# Patient Record
Sex: FEMALE | Race: WHITE
[De-identification: names, ages, dates, MRNs, and addresses within clinical notes are randomized per-mention and may not be internally consistent; named-entity substitution may affect disease eponyms.]

---

## 2018-12-04 ENCOUNTER — HOSPITAL ENCOUNTER (INPATIENT)
Dept: HOSPITAL 25 - ED | Age: 23
LOS: 8 days | Discharge: HOME | DRG: 885 | End: 2018-12-12
Attending: PSYCHIATRY & NEUROLOGY | Admitting: PSYCHIATRY & NEUROLOGY
Payer: COMMERCIAL

## 2018-12-04 DIAGNOSIS — Z81.8: ICD-10-CM

## 2018-12-04 DIAGNOSIS — R45.851: ICD-10-CM

## 2018-12-04 DIAGNOSIS — F32.2: Primary | ICD-10-CM

## 2018-12-04 DIAGNOSIS — E03.9: ICD-10-CM

## 2018-12-04 DIAGNOSIS — Z79.899: ICD-10-CM

## 2018-12-04 DIAGNOSIS — F41.1: ICD-10-CM

## 2018-12-04 LAB
BASOPHILS # BLD AUTO: 0 10^3/UL (ref 0–0.2)
EOSINOPHIL # BLD AUTO: 0 10^3/UL (ref 0–0.6)
HCT VFR BLD AUTO: 39 % (ref 35–47)
HGB BLD-MCNC: 13.4 G/DL (ref 12–16)
LYMPHOCYTES # BLD AUTO: 3 10^3/UL (ref 1–4.8)
MCH RBC QN AUTO: 29 PG (ref 27–31)
MCHC RBC AUTO-ENTMCNC: 34 G/DL (ref 31–36)
MCV RBC AUTO: 83 FL (ref 80–97)
MONOCYTES # BLD AUTO: 0.5 10^3/UL (ref 0–0.8)
NEUTROPHILS # BLD AUTO: 3.9 10^3/UL (ref 1.5–7.7)
NRBC # BLD AUTO: 0 10^3/UL
NRBC BLD QL AUTO: 0.1
PLATELET # BLD AUTO: 209 10^3/UL (ref 150–450)
RBC # BLD AUTO: 4.7 10^6/UL (ref 4–5.4)
RBC UR QL AUTO: (no result)
WBC # BLD AUTO: 7.5 10^3/UL (ref 3.5–10.8)
WBC UR QL AUTO: (no result)

## 2018-12-04 PROCEDURE — 80329 ANALGESICS NON-OPIOID 1 OR 2: CPT

## 2018-12-04 PROCEDURE — 87086 URINE CULTURE/COLONY COUNT: CPT

## 2018-12-04 PROCEDURE — 99222 1ST HOSP IP/OBS MODERATE 55: CPT

## 2018-12-04 PROCEDURE — 81015 MICROSCOPIC EXAM OF URINE: CPT

## 2018-12-04 PROCEDURE — 82607 VITAMIN B-12: CPT

## 2018-12-04 PROCEDURE — 82306 VITAMIN D 25 HYDROXY: CPT

## 2018-12-04 PROCEDURE — 81003 URINALYSIS AUTO W/O SCOPE: CPT

## 2018-12-04 PROCEDURE — 80320 DRUG SCREEN QUANTALCOHOLS: CPT

## 2018-12-04 PROCEDURE — 90686 IIV4 VACC NO PRSV 0.5 ML IM: CPT

## 2018-12-04 PROCEDURE — 99238 HOSP IP/OBS DSCHRG MGMT 30/<: CPT

## 2018-12-04 PROCEDURE — 80053 COMPREHEN METABOLIC PANEL: CPT

## 2018-12-04 PROCEDURE — 99283 EMERGENCY DEPT VISIT LOW MDM: CPT

## 2018-12-04 PROCEDURE — 90853 GROUP PSYCHOTHERAPY: CPT

## 2018-12-04 PROCEDURE — 84443 ASSAY THYROID STIM HORMONE: CPT

## 2018-12-04 PROCEDURE — 85025 COMPLETE CBC W/AUTO DIFF WBC: CPT

## 2018-12-04 PROCEDURE — 99231 SBSQ HOSP IP/OBS SF/LOW 25: CPT

## 2018-12-04 PROCEDURE — 80307 DRUG TEST PRSMV CHEM ANLYZR: CPT

## 2018-12-04 PROCEDURE — 36415 COLL VENOUS BLD VENIPUNCTURE: CPT

## 2018-12-04 PROCEDURE — 99232 SBSQ HOSP IP/OBS MODERATE 35: CPT

## 2018-12-04 PROCEDURE — G0480 DRUG TEST DEF 1-7 CLASSES: HCPCS

## 2018-12-04 NOTE — ED
Psychiatric Complaint





- HPI Summary


HPI Summary: 





This pt is a 24 y/o female presenting to [facility] via EMS from 3 on a 9.41. Pt 

reports she has been severely depressed with SI thoughts and plan. Pt states "I 

have been spiraling really hard recently and it happens a lot." She notes since 

the end of last week pt has been getting "Really bad thought about self harm 

with a plan." She describes SI plan 4 days ago of hanging herself. Pt notes on 

the bus ride home yesterday pt had a sudden urge to take a knife to her 

"fingertips and scrape all the meat until I can see the bone." She has taken 

Fluoxetine every day, except she stopped taken it since 3 days ago. Pt does 

admit that increased SI thoughts and plan corresponds to when she stopped 

taking her medication.


Pt denies prior suicide attempt in the past. 


PMHx includes depression and anxiety.





- History Of Current Complaint


Time Seen by Provider: 12/04/18 12:43


Hx Obtained From: Patient, EMS


Hx Last Menstrual Period: 3/14/16


Onset/Duration: Lasting Days, Still Present


Timing: Days


Severity Currently: Severe


Character: Depressed


Aggravating Factor(s): Medication Non-compliance


Alleviating Factor(s): Nothing


Associated Signs And Symptoms: Positive: Confused


Related History: Positive For: Prior Psychiatric Issues


Has Suicidal: Reports: Thoughts, With A Plan


Has Homicidal: Denies: Thoughts, With A Plan





- Allergies/Home Medications


Allergies/Adverse Reactions: 


 Allergies











Allergy/AdvReac Type Severity Reaction Status Date / Time


 


Penicillins Allergy  Hives Verified 12/04/18 13:24














PMH/Surg Hx/FS Hx/Imm Hx


Endocrine/Hematology History: Reports: Other Endocrine/Hematological Disorders 

- hyperthyroidism


   Denies: Hx Anticoagulant Therapy, Hx Diabetes, Hx Thyroid Disease


Cardiovascular History: 


   Denies: Hx Congestive Heart Failure, Hx Deep Vein Thrombosis, Hx Hypertension

, Hx Myocardial Infarction, Hx Pacemaker/ICD


Respiratory History: Reports: Hx Asthma - CHILDHOOD


   Denies: Hx Chronic Obstructive Pulmonary Disease (COPD), Hx Lung Cancer, Hx 

Pneumonia, Hx Pulmonary Embolism


GI History: 


   Denies: Hx Gall Bladder Disease, Hx Gastrointestinal Bleed, Hx Ulcer, Hx 

Urosepsis


 History: 


   Denies: Hx Kidney Stones, Hx Renal Disease


Neurological History: Reports: Other Neuro Impairments/Disorders - scoliosis


   Denies: Hx Dementia, Hx Migraine, Hx Seizures, Hx Transient Ischemic Attacks 

(TIA)


Psychiatric History: Reports: Hx Anxiety, Hx Depression


   Denies: Hx Eating Disorder, Hx of Violent Episodes Against Others





- Family History


Known Family History: 


   Negative: Cardiac Disease, Diabetes





- Social History


Alcohol Use: None


Substance Use Type: Reports: None


Smoking Status (MU): Never Smoked Tobacco





Review of Systems


Negative: Fever, Chills


Cardiovascular: Negative


Respiratory: Negative


Gastrointestinal: Negative


Psychological: Other - POS: SI thoughts and plan


Positive: Depressed.  Negative: Other - HI


All Other Systems Reviewed And Are Negative: Yes





Physical Exam





- Summary


Physical Exam Summary: 





VITAL SIGNS: Reviewed.


GENERAL: Patient is a well-developed and nourished female. Patient is not in 

any acute respiratory distress.


HEAD AND FACE: No signs of trauma. No ecchymosis, hematomas or skull 

depressions. No sinus tenderness.


EYES: PERRLA, EOMI x 2, No injected conjunctiva, no nystagmus.


EARS: Hearing grossly intact. Ear canals and tympanic membranes are within 

normal limits.


MOUTH: Oropharynx within normal limits.


NECK: Supple, trachea is midline, no adenopathy, no JVD, no carotid bruit, no c-

spine tenderness, neck with full ROM.


CHEST: Symmetric, no tenderness at palpation


LUNGS: Clear to auscultation bilaterally. No wheezing or crackles.


CVS: Regular rate and rhythm, S1 and S2 present, no murmurs or gallops 

appreciated.


ABDOMEN: Soft, non-tender. No signs of distention. No rebound, no guarding, and 

no masses palpated. Bowel sounds are normal.


EXTREMITIES: FROM in all major joints, no edema, no cyanosis or clubbing.


NEURO: Alert and oriented x 3. No acute neurological deficits. Speech is normal 

and follows commands.


SKIN: Dry and warm


PSYCH: appears to be sad, reports SI thoughts and plan


Triage Information Reviewed: Yes


Vital Signs Reviewed: Yes





Diagnostics





- Laboratory


Result Diagrams: 


 12/04/18 13:05





 12/04/18 13:05


Lab Statement: Any lab studies that have been ordered have been reviewed, and 

results considered in the medical decision making process.





Re-Evaluation





- Re-Evaluation


  ** First Eval


Re-Evaluation Time: 12:47


Comment: Pt is medically cleared.





Course/Dx





- Course


Assessment/Plan: This pt is a 24 y/o female presenting to [facility] via EMS from 

[facility] on a 9.41. Pt reports she has been severely depressed with SI thoughts and 

plan. Pt states "I have been spiraling really hard recently and it happens a 

lot." She notes since the end of last week pt has been getting "Really bad 

thought about self harm with a plan." She describes SI plan 4 days ago of 

hanging herself. Pt notes on the bus ride home yesterday pt had a sudden urge 

to take a knife to her "fingertips and scrape all the meat until I can see the 

bone." She has taken Fluoxetine every day, except she stopped taken it since 3 

days ago. Pt does admit that increased SI thoughts and plan corresponds to when 

she stopped taking her medication.  Pt denies prior suicide attempt in the 

past.  Blood work w/o a significant abnormality.  She is medically cleared.  

She is awaiting for a MHE.  Patient is hemodynamically stable and A+O x 3.  

Patient was evaluated by Dr. Donald, psychiatrist, who recommends admission. Pt 

will be admitted on a voluntary status to [facility] psychiatric facility with dx 

unspecified depression.





- Differential Dx/Clinical Impression


Provider Diagnosis: 


 Depression








Discharge





- Sign-Out/Discharge


Documenting (check all that apply): Patient Departure - Admit to [facility] PSYCH


All imaging exams completed and their final reports reviewed: No Studies





- Discharge Plan


Condition: Stable


Disposition: PSYCHIATRIC FACILITY-[facility]





- Billing Disposition and Condition


Condition: STABLE


Disposition: Psychiatric Facility [facility]





- Attestation Statements


Document Initiated by Jarrod: Yes


Documenting Scribe: Saige Perez


Provider For Whom Jarrod is Documenting (Include Credential): Mirza Segovia MD


Scribe Attestation: 


Saige JARAMILLO, scribed for Mirza Segovia MD on 12/04/18 at 1837. 


Scribe Documentation Reviewed: Yes


Provider Attestation: 


The documentation as recorded by the Saige harry accurately reflects 

the service I personally performed and the decisions made by me, Mirza eSgovia MD


Status of Scribe Document: Viewed

## 2018-12-05 RX ADMIN — METOPROLOL SUCCINATE SCH MG: 25 TABLET, EXTENDED RELEASE ORAL at 16:35

## 2018-12-05 RX ADMIN — METHIMAZOLE SCH MG: 5 TABLET ORAL at 16:35

## 2018-12-05 RX ADMIN — THERA TABS SCH TAB: TAB at 08:34
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
## 2018-12-06 RX ADMIN — THERA TABS SCH TAB: TAB at 09:08

## 2018-12-06 RX ADMIN — METOPROLOL SUCCINATE SCH: 25 TABLET, EXTENDED RELEASE ORAL at 09:14

## 2018-12-06 RX ADMIN — FLUOXETINE SCH MG: 20 CAPSULE ORAL at 09:08

## 2018-12-06 RX ADMIN — METHIMAZOLE SCH MG: 5 TABLET ORAL at 09:09

## 2018-12-06 NOTE — PN
Subjective





- Subjective


Date of Service: 12/06/18


Service Type: 61319 Hosp care 15 min low complexity


Subjective: 





Ms. Gates is seen in coverage for NPP, Rubi Silver.  The patient remains 

depressed and anxious and I understand, through unit psychologist Bo Dhillon, 

that her MMPI was consistent with a unipolar affective presentation.  The 

patient is tolerating the increase in fluoxetine well and denies untoward 

effects.  She denies SI or HI.





Objective





- Appearance


Appearance: Well Developed/Nourished


Dysmorphic Features: No


Hygiene: Normal


Grooming: Well Kept





- Behavior


Psychomotor Activities: Normal


Exhibits Abnormal Movement: No





- Attitude and Relatedness


Attitude and Relatedness: Cooperative


Eye Contact: Fair





- Speech


Quality: Unpressured


Latencies: Normal


Quantity: Appropriate





- Mood


Patient's Decription of Mood: "Sad"





- Affect


Observed Affect: Constricted


Affect Consistent with: Dysphoria





- Thought Process


Patient's Thought Process: Coherent


Thought Content: No Passive Death Wish, No Suicidal Planning, No Homicidal 

Ideation, No Paranoid Ideation





- Sensorium


Experiencing Hallucinations: No, Sensorium is Clear


Type of Hallucinations: Visual: No, Auditory: No, Command: No





- Level of Consciousness


Level of Consciousness: Alert


Orientation: Yes Intact, Yes Orientated to Time, Yes Orientated to Place, Yes 

Orientated to Person





- Impulse Control


Impulse Control: Tenuous





- Insight and Judgement


Insight and Judgement: Fair





- Group Participation


Particating in Group Activities: Yes





- Medication Management


Medication Management Adherence: Yes





Assessment





- Assessment


Merits Inpatient Hospitalization: For Immediate Safety, For Stabilization


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


Clinical Impression: 





23 y.o. 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 

depression arrives on a voluntary basis seeking inpatient treatment for 

depression, anxiety and suicidal thoughts with plans.





MHU: Problem List





- Patient Problems


(1) Major depressive disorder, single episode, severe


Current Visit: Yes   Status: Acute   Code(s): F32.2 - MAJOR DEPRESSV DISORD, 

SINGLE EPSD, SEV W/O PSYCH FEATURES   SNOMED Code(s): 51997888


   





Plan





- Plan


Treatment Plan: 


Name: CINDY PEÑA                        


YOB: 1995                        


[id number]


[id number]








The patient's fluoxetine was increased to 40mg daily and her other medications, 

including methimazole, metoprolol and melatonin are continued.  Continue 

inpatient treatment.


Continued Medication Management: Continue Outpt Medication


Medications: 


 Current Medications





Acetaminophen (Tylenol Tab*)  650 mg PO Q4H PRN


   PRN Reason: PAIN or TEMP > 101 F


Al Hydrox/Mg Hydrox/Simethicone (Maalox Plus*)  30 ml PO Q4H PRN


   PRN Reason: INDIGESTION


Fluoxetine HCl (Prozac Cap*)  40 mg PO DAILY [facility]


   Last Admin: 12/06/18 09:08 Dose:  40 mg


Melatonin (Melatonin)  3 mg PO BEDTIME PRN; Protocol


   PRN Reason: INSOMNIA


Methimazole (Tapazole Tab*)  10 mg PO DAILY [facility]


   Last Admin: 12/06/18 09:09 Dose:  10 mg


Metoprolol Succinate (Toprol Xl Tab*)  25 mg PO DAILY [facility]


   Last Admin: 12/06/18 09:14 Dose:  Not Given


Multivitamins (Theragran Tab*)  1 tab PO DAILY [facility]


   Last Admin: 12/06/18 09:08 Dose:  1 tab











- Discharge Plan


Discharge Plan: Inpatient Hospitalization

## 2018-12-07 RX ADMIN — FLUOXETINE SCH MG: 20 CAPSULE ORAL at 08:24

## 2018-12-07 RX ADMIN — THERA TABS SCH TAB: TAB at 08:24

## 2018-12-07 RX ADMIN — METOPROLOL SUCCINATE SCH MG: 25 TABLET, EXTENDED RELEASE ORAL at 08:24

## 2018-12-07 RX ADMIN — METHIMAZOLE SCH MG: 5 TABLET ORAL at 08:25

## 2018-12-07 NOTE — PN
Subjective





- Subjective


Date of Service: 12/07/18


Service Type: 70988 Hosp care 25 min moderate complexity


Subjective: 


patient reports improvement in depression, continues to endorse anxiety.  she 

is benefitting from milieu and noted to be interactive with peers.  she states 

this is "tiring" but identifies the benefit.  Her parents were present for 

visiting hours and took this opportunity to meet with writer and Reba Nava LMSW.  We discussed events leading to admission, treatment thus far and family 

dynamics.  Patient denies readiness for discharge. 





Objective





- Appearance


Appearance: Thin Framed


Dysmorphic Features: Yes


Hygiene: Normal


Grooming: Well Kept





- Behavior


Psychomotor Activities: Normal


Exhibits Abnormal Movement: No





- Attitude and Relatedness


Attitude and Relatedness: Cooperative


Eye Contact: Fair





- Speech


Quality: Unpressured


Latencies: Normal


Quantity: Appropriate





- Mood


Patient's Decription of Mood: "Okay"





- Affect


Observed Affect: Depressed


Affect Consistent with: Dysphoria





- Thought Process


Patient's Thought Process: Coherent


Thought Content: Yes Passive Death Wish, No Suicidal Planning, No Homicidal 

Ideation, No Paranoid Ideation





- Sensorium


Experiencing Hallucinations: No, Sensorium is Clear


Type of Hallucinations: Visual: No, Auditory: No, Command: No





- Level of Consciousness


Level of Consciousness: Alert


Orientation: Yes Intact, Yes Orientated to Time, Yes Orientated to Place, Yes 

Orientated to Person





- Impulse Control


Impulse Control: Tenuous





- Insight and Judgement


Insight and Judgement: Fair





- Group Participation


Particating in Group Activities: Yes





- Medication Management


Medication Management Adherence: Yes





Assessment





- Assessment


Merits Inpatient Hospitalization: For Immediate Safety, For Stabilization


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


Clinical Impression: 





23 y.o. 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 

depression arrives on a voluntary basis seeking inpatient treatment for 

depression, anxiety and suicidal thoughts with plans.  





Plan





- Plan


Treatment Plan: 


Name: CINDY PEÑA                        


YOB: 1995                        


[id number]


[id number]








continue acute intensive psychiatric treatment.  may decrease to q30min obs, 

allow staff pass per RN discretion.


consider changing metoprolol to propranolol 


discharge planning to include family and outpatient referrals. 


Continued Medication Management: Continue Outpt Medication


Medications: 


 Current Medications





Acetaminophen (Tylenol Tab*)  650 mg PO Q4H PRN


   PRN Reason: PAIN or TEMP > 101 F


Al Hydrox/Mg Hydrox/Simethicone (Maalox Plus*)  30 ml PO Q4H PRN


   PRN Reason: INDIGESTION


Fluoxetine HCl (Prozac Cap*)  40 mg PO DAILY [facility]


   Last Admin: 12/07/18 08:24 Dose:  40 mg


Melatonin (Melatonin)  3 mg PO BEDTIME PRN; Protocol


   PRN Reason: INSOMNIA


Methimazole (Tapazole Tab*)  10 mg PO DAILY [facility]


   Last Admin: 12/07/18 08:25 Dose:  10 mg


Metoprolol Succinate (Toprol Xl Tab*)  25 mg PO DAILY [facility]


   Last Admin: 12/07/18 08:24 Dose:  25 mg


Multivitamins (Theragran Tab*)  1 tab PO DAILY [facility]


   Last Admin: 12/07/18 08:24 Dose:  1 tab











- Discharge Plan


Discharge Plan: Inpatient Hospitalization

## 2018-12-08 RX ADMIN — FLUOXETINE SCH MG: 20 CAPSULE ORAL at 08:42

## 2018-12-08 RX ADMIN — METHIMAZOLE SCH MG: 5 TABLET ORAL at 08:40

## 2018-12-08 RX ADMIN — THERA TABS SCH TAB: TAB at 08:42

## 2018-12-08 RX ADMIN — METOPROLOL SUCCINATE SCH MG: 25 TABLET, EXTENDED RELEASE ORAL at 08:41

## 2018-12-08 NOTE — PN
Subjective





- Subjective


Date of Service: 12/08/18


Subjective: 


Kaila was found knitting in milieu, was agreeable to interview.  State that 

her depression and mood has improved.  Reporting that she is attending groups, 

had a family meeting that went well, is hopign for discharge early in the week.

  States that she is hopeful to utilize the lessons for groups to talk about 

her depression and not "bottle it up."  Reporting good sleep, good appetite and 

generally feeling "good."





Objective





- Appearance


Appearance: Well Developed/Nourished


Dysmorphic Features: No


Hygiene: Normal


Grooming: Well Kept





- Behavior


Psychomotor Activities: Normal


Exhibits Abnormal Movement: No





- Attitude and Relatedness


Attitude and Relatedness: Cooperative


Eye Contact: Fair





- Speech


Quality: Unpressured


Latencies: Normal


Quantity: Appropriate





- Mood


Patient's Decription of Mood: "Good"





- Affect


Observed Affect: Good


Affect Consistent with: Euthymia





- Thought Process


Patient's Thought Process: Coherent


Thought Content: No Passive Death Wish, No Suicidal Planning, No Homicidal 

Ideation, No Paranoid Ideation





- Sensorium


Experiencing Hallucinations: No, Sensorium is Clear


Type of Hallucinations: Visual: No, Auditory: No, Command: No





- Level of Consciousness


Level of Consciousness: Alert


Orientation: Yes Intact, Yes Orientated to Time, Yes Orientated to Place, Yes 

Orientated to Person





- Impulse Control


Impulse Control: Intact





- Insight and Judgement


Insight and Judgement: Good





- Group Participation


Particating in Group Activities: Yes





- Medication Management


Medication Management Adherence: Yes





Assessment





- Assessment


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


Clinical Impression: 


Kaila presents with improved mood and affect (euthymic mood and affect), 

visible on the unit, was found knitting, states that it is unfortunate that she 

was admitted but has been enjoying reading "for fun".  She is reporting stable 

mood and improvement of depressive symptoms with suicidal ideation.  During 

interview she had bright mood and affect, was conversant and smiled during 

interview, calm and pleasant.  Motivated for discharge.








Plan





- Plan


Treatment Plan: 


Name: KAILA PEÑA                        


YOB: 1995                        


[id number]


[id number]








continue acute intensive psychiatric treatment.  may decrease to q30min obs, 

allow staff pass per RN discretion.


consider changing metoprolol to propranolol 


discharge planning to include family and outpatient referrals. 


Medications: 


 Current Medications





Acetaminophen (Tylenol Tab*)  650 mg PO Q4H PRN


   PRN Reason: PAIN or TEMP > 101 F


Al Hydrox/Mg Hydrox/Simethicone (Maalox Plus*)  30 ml PO Q4H PRN


   PRN Reason: INDIGESTION


Fluoxetine HCl (Prozac Cap*)  40 mg PO DAILY [facility]


   Last Admin: 12/08/18 08:42 Dose:  40 mg


Melatonin (Melatonin)  3 mg PO BEDTIME PRN; Protocol


   PRN Reason: INSOMNIA


Methimazole (Tapazole Tab*)  10 mg PO DAILY [facility]


   Last Admin: 12/08/18 08:40 Dose:  10 mg


Metoprolol Succinate (Toprol Xl Tab*)  25 mg PO DAILY [facility]


   Last Admin: 12/08/18 08:41 Dose:  25 mg


Multivitamins (Theragran Tab*)  1 tab PO DAILY [facility]


   Last Admin: 12/08/18 08:42 Dose:  1 tab

## 2018-12-09 RX ADMIN — FLUOXETINE SCH MG: 20 CAPSULE ORAL at 08:37

## 2018-12-09 RX ADMIN — METOPROLOL SUCCINATE SCH MG: 25 TABLET, EXTENDED RELEASE ORAL at 08:38

## 2018-12-09 RX ADMIN — METHIMAZOLE SCH MG: 5 TABLET ORAL at 08:37

## 2018-12-09 RX ADMIN — THERA TABS SCH TAB: TAB at 08:38

## 2018-12-10 RX ADMIN — METHIMAZOLE SCH MG: 5 TABLET ORAL at 08:28

## 2018-12-10 RX ADMIN — THERA TABS SCH TAB: TAB at 08:28

## 2018-12-10 RX ADMIN — FLUOXETINE SCH MG: 20 CAPSULE ORAL at 08:28

## 2018-12-10 RX ADMIN — METOPROLOL SUCCINATE SCH MG: 25 TABLET, EXTENDED RELEASE ORAL at 08:27

## 2018-12-10 NOTE — PN
[facility]HU: Group Therapy Note





- Service Type


Service Type: 11243 Group Psychotherapy - Cognitive Behavioral Group Therapy (

CBT):Patient was attentive and participatory in CBT programming this morning, 

and remained in good behavioral control.  Patient expressed positive insights 

regarding relevant treatment interventions and goals.

## 2018-12-10 NOTE — PN
Subjective





- Subjective


Date of Service: 12/10/18


Service Type: 68042 Hosp care 15 min low complexity


Subjective: 


patient reports improved and sleep.  she reports feeling nervous around select 

patient who was combative. she denies panic or need for prn medications.  

patient is observed interacting with select peers and participates in groups.  

she agrees to meet with parents for discharge meeting on wednesday.   she 

states she intends to increase social interactions after discharge through 

joining [employer] club at school and continuing with [employer] game group.  





Objective





- Appearance


Appearance: Thin Framed


Dysmorphic Features: No


Hygiene: Normal


Grooming: Well Kept





- Behavior


Psychomotor Activities: Normal


Exhibits Abnormal Movement: No





- Attitude and Relatedness


Attitude and Relatedness: Cooperative


Eye Contact: Good





- Speech


Quality: Unpressured


Latencies: Normal


Quantity: Appropriate





- Mood


Patient's Decription of Mood: "Okay"





- Affect


Observed Affect: Good


Affect Consistent with: Euthymia





- Thought Process


Patient's Thought Process: Coherent, Goal Directed


Thought Content: No Passive Death Wish, No Suicidal Planning, No Homicidal 

Ideation, No Paranoid Ideation





- Sensorium


Experiencing Hallucinations: No, Sensorium is Clear


Type of Hallucinations: Visual: No, Auditory: No, Command: No





- Level of Consciousness


Level of Consciousness: Alert


Orientation: Yes Intact, Yes Orientated to Time, Yes Orientated to Place, Yes 

Orientated to Person





- Impulse Control


Impulse Control: Intact





- Insight and Judgement


Insight and Judgement: Good





- Group Participation


Particating in Group Activities: Yes





- Medication Management


Medication Management Adherence: Yes





Assessment





- Assessment


Merits Inpatient Hospitalization: For Immediate Safety, For Stabilization


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


Clinical Impression: 


22yo female who presented to ED with suicidal ideation and plans.  She is 

participating in programming and tolerated increase in fluoxetine.  Her parents 

were present for a family meeting and requested another for day of discharge.  

Patient agrees to meet again; tentative discharge meeting on 12/12/18.  





Plan





- Plan


Treatment Plan: 


Name: CINDY PEÑA                        


YOB: 1995                        


[id number]


[id number]








continue acute intensive psychiatric treatment.  may decrease to q30min obs, 

allow staff pass per RN discretion.


discharge tentative for 12/12/18. 


Medications: 


 Current Medications





Acetaminophen (Tylenol Tab*)  650 mg PO Q4H PRN


   PRN Reason: PAIN or TEMP > 101 F


Al Hydrox/Mg Hydrox/Simethicone (Maalox Plus*)  30 ml PO Q4H PRN


   PRN Reason: INDIGESTION


Fluoxetine HCl (Prozac Cap*)  40 mg PO DAILY [facility]


   Last Admin: 12/10/18 08:28 Dose:  40 mg


Melatonin (Melatonin)  3 mg PO BEDTIME PRN; Protocol


   PRN Reason: INSOMNIA


Methimazole (Tapazole Tab*)  10 mg PO DAILY [facility]


   Last Admin: 12/10/18 08:28 Dose:  10 mg


Metoprolol Succinate (Toprol Xl Tab*)  25 mg PO DAILY [facility]


   Last Admin: 12/10/18 08:27 Dose:  25 mg


Multivitamins (Theragran Tab*)  1 tab PO DAILY [facility]


   Last Admin: 12/10/18 08:28 Dose:  1 tab











- Discharge Plan


Discharge Plan: Outpatient Follow Up


Outpatient Program: [facility]

## 2018-12-11 RX ADMIN — METOPROLOL SUCCINATE SCH MG: 25 TABLET, EXTENDED RELEASE ORAL at 08:55

## 2018-12-11 RX ADMIN — METHIMAZOLE SCH MG: 5 TABLET ORAL at 08:53

## 2018-12-11 RX ADMIN — FLUOXETINE SCH MG: 20 CAPSULE ORAL at 08:54

## 2018-12-11 RX ADMIN — THERA TABS SCH TAB: TAB at 08:54

## 2018-12-12 VITALS — SYSTOLIC BLOOD PRESSURE: 101 MMHG | DIASTOLIC BLOOD PRESSURE: 59 MMHG

## 2018-12-12 RX ADMIN — METOPROLOL SUCCINATE SCH MG: 25 TABLET, EXTENDED RELEASE ORAL at 08:53

## 2018-12-12 RX ADMIN — METHIMAZOLE SCH MG: 5 TABLET ORAL at 08:54

## 2018-12-12 RX ADMIN — FLUOXETINE SCH MG: 20 CAPSULE ORAL at 08:53

## 2018-12-12 RX ADMIN — THERA TABS SCH TAB: TAB at 08:53

## 2018-12-12 NOTE — PN
[facility]: Group Therapy Note





- Service Type


Service Type: 71568 Group Psychotherapy - Cognitive Behavioral Group Therapy (

CBT):Patient attended CBT programming this morning and presented with flat 

affect that did not vary with discussion.  Although responsive to direct 

prompts to respond to questions, patient did not engage in spontaneous 

conversation.